# Patient Record
Sex: FEMALE | Race: OTHER | Employment: UNEMPLOYED | URBAN - METROPOLITAN AREA
[De-identification: names, ages, dates, MRNs, and addresses within clinical notes are randomized per-mention and may not be internally consistent; named-entity substitution may affect disease eponyms.]

---

## 2021-04-19 ENCOUNTER — HOSPITAL ENCOUNTER (EMERGENCY)
Age: 26
Discharge: HOME | End: 2021-04-19
Attending: EMERGENCY MEDICINE

## 2021-04-19 VITALS
DIASTOLIC BLOOD PRESSURE: 63 MMHG | RESPIRATION RATE: 16 BRPM | OXYGEN SATURATION: 100 % | TEMPERATURE: 100.2 F | SYSTOLIC BLOOD PRESSURE: 120 MMHG | WEIGHT: 157.9 LBS | HEART RATE: 64 BPM

## 2021-04-19 DIAGNOSIS — N30.00 ACUTE CYSTITIS WITHOUT HEMATURIA: Primary | ICD-10-CM

## 2021-04-19 DIAGNOSIS — D50.9 IRON DEFICIENCY ANEMIA, UNSPECIFIED IRON DEFICIENCY ANEMIA TYPE: ICD-10-CM

## 2021-04-19 LAB
ANION GAP SERPL CALCULATED.3IONS-SCNC: 9 MMOL/L (ref 3–16)
ANISOCYTOSIS: ABNORMAL
BACTERIA: ABNORMAL /HPF
BASOPHILS ABSOLUTE: 0 K/UL (ref 0–0.2)
BASOPHILS RELATIVE PERCENT: 0 %
BILIRUBIN URINE: NEGATIVE
BLOOD, URINE: ABNORMAL
BUN BLDV-MCNC: 8 MG/DL (ref 7–20)
CALCIUM SERPL-MCNC: 8.8 MG/DL (ref 8.3–10.6)
CHLORIDE BLD-SCNC: 98 MMOL/L (ref 99–110)
CLARITY: CLEAR
CO2: 25 MMOL/L (ref 21–32)
COLOR: YELLOW
CREAT SERPL-MCNC: 0.7 MG/DL (ref 0.6–1.1)
EOSINOPHILS ABSOLUTE: 0 K/UL (ref 0–0.6)
EOSINOPHILS RELATIVE PERCENT: 0 %
EPITHELIAL CELLS, UA: ABNORMAL /HPF (ref 0–5)
FERRITIN: 11.2 NG/ML (ref 15–150)
GFR AFRICAN AMERICAN: >60
GFR NON-AFRICAN AMERICAN: >60
GLUCOSE BLD-MCNC: 94 MG/DL (ref 70–99)
GLUCOSE URINE: NEGATIVE MG/DL
HCG(URINE) PREGNANCY TEST: NEGATIVE
HCT VFR BLD CALC: 25.5 % (ref 36–48)
HCT VFR BLD CALC: 25.6 % (ref 36–48)
HEMOGLOBIN: 7.1 G/DL (ref 12–16)
HYPOCHROMIA: ABNORMAL
IMMATURE RETIC FRACT: 0.42 (ref 0.21–0.37)
KETONES, URINE: NEGATIVE MG/DL
LEUKOCYTE ESTERASE, URINE: ABNORMAL
LYMPHOCYTES ABSOLUTE: 0.6 K/UL (ref 1–5.1)
LYMPHOCYTES RELATIVE PERCENT: 6 %
MCH RBC QN AUTO: 15.4 PG (ref 26–34)
MCHC RBC AUTO-ENTMCNC: 27.7 G/DL (ref 31–36)
MCV RBC AUTO: 55.5 FL (ref 80–100)
MICROSCOPIC EXAMINATION: YES
MONOCYTES ABSOLUTE: 0.2 K/UL (ref 0–1.3)
MONOCYTES RELATIVE PERCENT: 2 %
NEUTROPHILS ABSOLUTE: 9.4 K/UL (ref 1.7–7.7)
NEUTROPHILS RELATIVE PERCENT: 92 %
NITRITE, URINE: POSITIVE
OVALOCYTES: ABNORMAL
PDW BLD-RTO: 21.5 % (ref 12.4–15.4)
PH UA: 6 (ref 5–8)
PLATELET # BLD: 303 K/UL (ref 135–450)
PMV BLD AUTO: 8 FL (ref 5–10.5)
POTASSIUM REFLEX MAGNESIUM: 3.6 MMOL/L (ref 3.5–5.1)
PROTEIN UA: NEGATIVE MG/DL
RBC # BLD: 4.59 M/UL (ref 4–5.2)
RBC UA: ABNORMAL /HPF (ref 0–4)
RETICULOCYTE ABSOLUTE COUNT: 0.07 M/UL (ref 0.02–0.1)
RETICULOCYTE COUNT PCT: 1.54 % (ref 0.5–2.18)
SODIUM BLD-SCNC: 132 MMOL/L (ref 136–145)
SPECIFIC GRAVITY UA: 1.01 (ref 1–1.03)
URINE TYPE: ABNORMAL
UROBILINOGEN, URINE: 0.2 E.U./DL
WBC # BLD: 10.2 K/UL (ref 4–11)
WBC UA: ABNORMAL /HPF (ref 0–5)

## 2021-04-19 PROCEDURE — 6360000002 HC RX W HCPCS: Performed by: STUDENT IN AN ORGANIZED HEALTH CARE EDUCATION/TRAINING PROGRAM

## 2021-04-19 PROCEDURE — 83550 IRON BINDING TEST: CPT

## 2021-04-19 PROCEDURE — 36415 COLL VENOUS BLD VENIPUNCTURE: CPT

## 2021-04-19 PROCEDURE — 85025 COMPLETE CBC W/AUTO DIFF WBC: CPT

## 2021-04-19 PROCEDURE — 82728 ASSAY OF FERRITIN: CPT

## 2021-04-19 PROCEDURE — 81001 URINALYSIS AUTO W/SCOPE: CPT

## 2021-04-19 PROCEDURE — U0003 INFECTIOUS AGENT DETECTION BY NUCLEIC ACID (DNA OR RNA); SEVERE ACUTE RESPIRATORY SYNDROME CORONAVIRUS 2 (SARS-COV-2) (CORONAVIRUS DISEASE [COVID-19]), AMPLIFIED PROBE TECHNIQUE, MAKING USE OF HIGH THROUGHPUT TECHNOLOGIES AS DESCRIBED BY CMS-2020-01-R: HCPCS

## 2021-04-19 PROCEDURE — 6370000000 HC RX 637 (ALT 250 FOR IP): Performed by: STUDENT IN AN ORGANIZED HEALTH CARE EDUCATION/TRAINING PROGRAM

## 2021-04-19 PROCEDURE — 2580000003 HC RX 258: Performed by: STUDENT IN AN ORGANIZED HEALTH CARE EDUCATION/TRAINING PROGRAM

## 2021-04-19 PROCEDURE — 96375 TX/PRO/DX INJ NEW DRUG ADDON: CPT

## 2021-04-19 PROCEDURE — 85045 AUTOMATED RETICULOCYTE COUNT: CPT

## 2021-04-19 PROCEDURE — 99283 EMERGENCY DEPT VISIT LOW MDM: CPT

## 2021-04-19 PROCEDURE — U0005 INFEC AGEN DETEC AMPLI PROBE: HCPCS

## 2021-04-19 PROCEDURE — 96374 THER/PROPH/DIAG INJ IV PUSH: CPT

## 2021-04-19 PROCEDURE — 84703 CHORIONIC GONADOTROPIN ASSAY: CPT

## 2021-04-19 PROCEDURE — 80048 BASIC METABOLIC PNL TOTAL CA: CPT

## 2021-04-19 PROCEDURE — 83540 ASSAY OF IRON: CPT

## 2021-04-19 RX ORDER — KETOROLAC TROMETHAMINE 30 MG/ML
15 INJECTION, SOLUTION INTRAMUSCULAR; INTRAVENOUS ONCE
Status: COMPLETED | OUTPATIENT
Start: 2021-04-19 | End: 2021-04-19

## 2021-04-19 RX ORDER — SODIUM CHLORIDE, SODIUM LACTATE, POTASSIUM CHLORIDE, CALCIUM CHLORIDE 600; 310; 30; 20 MG/100ML; MG/100ML; MG/100ML; MG/100ML
1000 INJECTION, SOLUTION INTRAVENOUS ONCE
Status: COMPLETED | OUTPATIENT
Start: 2021-04-19 | End: 2021-04-19

## 2021-04-19 RX ORDER — LANOLIN ALCOHOL/MO/W.PET/CERES
325 CREAM (GRAM) TOPICAL 2 TIMES DAILY
Qty: 90 TABLET | Refills: 3 | Status: SHIPPED | OUTPATIENT
Start: 2021-04-19

## 2021-04-19 RX ORDER — CEPHALEXIN 500 MG/1
500 CAPSULE ORAL 4 TIMES DAILY
Qty: 20 CAPSULE | Refills: 0 | Status: SHIPPED | OUTPATIENT
Start: 2021-04-19 | End: 2021-04-24

## 2021-04-19 RX ORDER — PROCHLORPERAZINE EDISYLATE 5 MG/ML
10 INJECTION INTRAMUSCULAR; INTRAVENOUS ONCE
Status: COMPLETED | OUTPATIENT
Start: 2021-04-19 | End: 2021-04-19

## 2021-04-19 RX ORDER — CEPHALEXIN 500 MG/1
500 CAPSULE ORAL ONCE
Status: COMPLETED | OUTPATIENT
Start: 2021-04-19 | End: 2021-04-19

## 2021-04-19 RX ADMIN — KETOROLAC TROMETHAMINE 15 MG: 30 INJECTION, SOLUTION INTRAMUSCULAR at 17:26

## 2021-04-19 RX ADMIN — PROCHLORPERAZINE EDISYLATE 10 MG: 5 INJECTION INTRAMUSCULAR; INTRAVENOUS at 17:26

## 2021-04-19 RX ADMIN — CEPHALEXIN 500 MG: 500 CAPSULE ORAL at 18:08

## 2021-04-19 RX ADMIN — SODIUM CHLORIDE, SODIUM LACTATE, POTASSIUM CHLORIDE, AND CALCIUM CHLORIDE 1000 ML: .6; .31; .03; .02 INJECTION, SOLUTION INTRAVENOUS at 17:26

## 2021-04-19 ASSESSMENT — PAIN DESCRIPTION - LOCATION: LOCATION: HEAD

## 2021-04-19 ASSESSMENT — PAIN SCALES - GENERAL: PAINLEVEL_OUTOF10: 8

## 2021-04-19 ASSESSMENT — PAIN DESCRIPTION - PAIN TYPE: TYPE: ACUTE PAIN

## 2021-04-19 ASSESSMENT — PAIN DESCRIPTION - FREQUENCY: FREQUENCY: CONTINUOUS

## 2021-04-19 ASSESSMENT — PAIN DESCRIPTION - DESCRIPTORS: DESCRIPTORS: HEADACHE

## 2021-04-19 NOTE — ED PROVIDER NOTES
ED Attending Attestation Note     Date of evaluation: 4/19/2021    This patient was seen by the resident. I have seen and examined the patient, agree with the workup, evaluation, management and diagnosis. The care plan has been discussed. My assessment reveals patient here with HA and stomach discomfort. Found to have UTI and to be profoundly anemic (asymptomatic) and will need followup at the [facility] clinic since no PCP. Patient will be treated for UTI.      Hillary Dale MD  04/19/21 2051

## 2021-04-19 NOTE — ED PROVIDER NOTES
[address] [facility] RESIDENT NOTE       Date of evaluation: 4/19/2021    Chief Complaint     Headache (generalized body aches since last night)      History of Present Illness     Veronique Jarquin is a 31 y.o. female who presents for a generalized headache onset last night. This is associated with generalized body aches, generalized abdominal cramping, and dysuria. Denies any fever, neck pain or neck stiffness or photophobia or any rashes. No vaginal bleeding. No chest pain or difficulty breathing. No fatigue or dizziness or lightheadedness or syncope. No flank pain. No recent URI symptoms or cough. Has not had her Covid vaccine yet. Denies any known Covid positive exposures. No focal weakness, numbness or paresthesias. She took Tylenol last night which did not help her symptoms. Denies any trauma to her head, headache was gradual in onset. Review of Systems   As per HPI, otherwise all other systems reviewed and negative. Past Medical, Surgical, Family, and Social History     She has no past medical history on file. She has no past surgical history on file. Her family history is not on file. She reports that she has never smoked. She has never used smokeless tobacco. She reports previous alcohol use. She reports previous drug use. Medications     Previous Medications    No medications on file       Allergies     She has No Known Allergies. Physical Exam     INITIAL VITALS: BP: 133/67, Temp: 100.2 °F (37.9 °C), Pulse: 98, Resp: 17, SpO2: 100 %     General:  Well-developed, NAD  HEENT:  Normocephalic, atraumatic, PERRL, EOMI, slightly dry mucous membranes  Neck:  Supple, trachea midline, full range of motion, no nuchal rigidity, no meningismus  Pulmonary:   CTAB, good air movement  Cardiac:  RRR, 3/6 systolic murmur  Abdomen:  Soft, non-tender, non-distended, no rebounding or guarding.   No CVA tenderness bilaterally  Musculoskeletal:  2+ pulses, no peripheral edema  Skin:  No rashes or lesions  Neuro: aox4, able to move all four extremities, equal strength BUE/BLE, sensory exam grossly intact, cranial nerves II-XII intact  Psych:  Appropriate mood and affect    DiagnosticResults     EKG   N/A    RADIOLOGY:  No orders to display       LABS:   Results for orders placed or performed during the hospital encounter of 04/19/21   Urinalysis, reflex to microscopic   Result Value Ref Range    Color, UA Yellow Straw/Yellow    Clarity, UA Clear Clear    Glucose, Ur Negative Negative mg/dL    Bilirubin Urine Negative Negative    Ketones, Urine Negative Negative mg/dL    Specific Gravity, UA 1.015 1.005 - 1.030    Blood, Urine SMALL (A) Negative    pH, UA 6.0 5.0 - 8.0    Protein, UA Negative Negative mg/dL    Urobilinogen, Urine 0.2 <2.0 E.U./dL    Nitrite, Urine POSITIVE (A) Negative    Leukocyte Esterase, Urine SMALL (A) Negative    Microscopic Examination YES     Urine Type NotGiven    Pregnancy, urine   Result Value Ref Range    HCG(Urine) Pregnancy Test Negative Detects HCG level >20 MIU/mL   CBC Auto Differential   Result Value Ref Range    WBC 10.2 4.0 - 11.0 K/uL    RBC 4.59 4.00 - 5.20 M/uL    Hemoglobin 7.1 (L) 12.0 - 16.0 g/dL    Hematocrit 25.5 (L) 36.0 - 48.0 %    MCV 55.5 (L) 80.0 - 100.0 fL    MCH 15.4 (L) 26.0 - 34.0 pg    MCHC 27.7 (L) 31.0 - 36.0 g/dL    RDW 21.5 (H) 12.4 - 15.4 %    Platelets 623 059 - 950 K/uL    MPV 8.0 5.0 - 10.5 fL    Neutrophils % 92.0 %    Lymphocytes % 6.0 %    Monocytes % 2.0 %    Eosinophils % 0.0 %    Basophils % 0.0 %    Neutrophils Absolute 9.4 (H) 1.7 - 7.7 K/uL    Lymphocytes Absolute 0.6 (L) 1.0 - 5.1 K/uL    Monocytes Absolute 0.2 0.0 - 1.3 K/uL    Eosinophils Absolute 0.0 0.0 - 0.6 K/uL    Basophils Absolute 0.0 0.0 - 0.2 K/uL    Anisocytosis 1+ (A)     Hypochromia 1+ (A)     Ovalocytes Occasional (A)    Basic Metabolic Panel w/ Reflex to MG   Result Value Ref Range    Sodium 132 (L) 136 - 145 mmol/L    Potassium reflex Magnesium 3.6 3.5 - 5.1 mmol/L    Chloride 98 (L) 99 - 110 mmol/L    CO2 25 21 - 32 mmol/L    Anion Gap 9 3 - 16    Glucose 94 70 - 99 mg/dL    BUN 8 7 - 20 mg/dL    CREATININE 0.7 0.6 - 1.1 mg/dL    GFR Non-African American >60 >60    GFR African American >60 >60    Calcium 8.8 8.3 - 10.6 mg/dL   Microscopic Urinalysis   Result Value Ref Range    WBC, UA 6-9 (A) 0 - 5 /HPF    RBC, UA 0-2 0 - 4 /HPF    Epithelial Cells, UA 0-1 0 - 5 /HPF    Bacteria, UA 4+ (A) None Seen /HPF   Ferritin   Result Value Ref Range    Ferritin 11.2 (L) 15.0 - 150.0 ng/mL   Reticulocytes   Result Value Ref Range    Retic Ct Pct 1.54 0.50 - 2.18 %    Retic Ct Abs 0.071 0.024 - 0.100 M/uL    Immature Retic Fract 0.42 (H) 0.21 - 0.37    Hematocrit 25.6 (L) 36.0 - 48.0 %       ED BEDSIDE ULTRASOUND:  N/A    RECENT VITALS:  BP: 120/63, Temp: 100.2 °F (37.9 °C), Pulse: 64,Resp: 16, SpO2: 100 %     Procedures   Procedures    ED Course     Nursing Notes, Past Medical Hx, Past Surgical Hx, Social Hx, Allergies, and Family Hx were reviewed. The patient was given the followingmedications:  Orders Placed This Encounter   Medications    lactated ringers infusion 1,000 mL    prochlorperazine (COMPAZINE) injection 10 mg    ketorolac (TORADOL) injection 15 mg    cephALEXin (KEFLEX) capsule 500 mg     Order Specific Question:   Antimicrobial Indications     Answer:   Urinary Tract Infection    cephALEXin (KEFLEX) 500 MG capsule     Sig: Take 1 capsule by mouth 4 times daily for 5 days     Dispense:  20 capsule     Refill:  0    ferrous sulfate (FE TABS) 325 (65 Fe) MG EC tablet     Sig: Take 1 tablet by mouth 2 times daily     Dispense:  90 tablet     Refill:  3       CONSULTS:  None    MEDICAL DECISION MAKING / ASSESSMENT / PLAN     Ana White is a 31 y.o. female who presents to the ED for generalized headache, body aches, abdominal cramping and dysuria since last night. On exam without any nuchal rigidity, neck stiffness or neck pain. Abdomen is soft, nontender, nondistended. Headache gradual in onset, not associated with any trauma. Will check UA, U-pregnancy, CBC, BMP, Covid, and administer IV fluids, Compazine and Toradol. This patient was also evaluated by the attending physician. All care plans were discussed and agreed upon. UA with evidence of a UTI. She was given first dose of Keflex in the ED. Urine pregnancy test is negative. Has mild hyponatremia however no KIYA. She feels improved after IV fluids, Compazine and Toradol. CBC resulted with significant anemia to 7.1 with MCV of 55. She denies any hemorrhagic symptoms. No symptomatic anemia to include dizziness, lightheadedness or fatigue or difficulty breathing. No previous records available for comparison. Unknown of the patient's murmur is new versus old, she does not recall being told of any previous murmurs. Denies a history of IV drug use. Ordered a reticulocyte count along with iron studies and folate and B12. Decrease ferritin level along with reticulocyte count. Appears chronic in nature. Given the fact that she is hemodynamically stable without symptomatic anemia, she will be discharged with return precautions and a prescription for iron. She was given a prescription for Keflex 500 mg 4 times daily x5 days. She was also given a number to call at the [facility]. outpatient clinic to establish care with a PCP. At this time the patient has been deemed safe for discharge. Verbal discharge instructions including strict return precautions for worsening or new symptoms have been communicated. The patient was advised to follow up with [facility] hospital outpatient clinic. Clinical Impression     1. Acute cystitis without hematuria    2.  Iron deficiency anemia, unspecified iron deficiency anemia type        Disposition     PATIENT REFERRED TO:  [facility] Emergency Department  [facility]  Go to

## 2021-04-19 NOTE — ED NOTES
Patient discharged to home via family. Written discharge instructions reviewed with understanding. Copy of AVS and signed prescription sent home with patient. Patient able to walk from ED without assistance.        Karla Longo RN  04/19/21 1925

## 2021-04-20 LAB
IRON SATURATION: 5 % (ref 15–50)
IRON: 19 UG/DL (ref 37–145)
SARS-COV-2: NOT DETECTED
TOTAL IRON BINDING CAPACITY: 352 UG/DL (ref 260–445)

## 2021-04-22 ENCOUNTER — NURSE TRIAGE (OUTPATIENT)
Dept: OTHER | Facility: CLINIC | Age: 26
End: 2021-04-22

## 2021-04-22 NOTE — PROGRESS NOTES
Shashank Schneider   31 y.o. female   1995    This is patient's first visit with me. Shashank Schneider is here to establish care as their new PCP. Shashank Schneider has a PMH significant for: There is no problem list on file for this patient. Reason(s) for visit:   Chief Complaint   Patient presents with    Established New Doctor    Follow-Up from Hospital     Pt was in the ER due to UTI. Religious on 4/19/21.  Urinary Tract Infection     Sx went away. HPI:    Office visit encounter: This is patient's first UTI. Patient's labs are remarkable for H/H of 7.1/25.5, MCV of 55.5, MCHC of 27.7, RDW of 21.5. BMP was only remarkable for hyponatremia of 132. Iron studies showed ferritin of 11.2 with low iron of 19, iron saturation of 5%, and normal TIBC of 352. Urine studies showed negative UPT while UA showed 4+ bacteria with 6-9 WBCs. No urine culture was ordered. She was also tested for COVID-19 and she is negative. Patient was treated for acute cystitis and discharged on 5-day course of cephalexin as well as iron tablets. He stated that she is significantly much better. No side effects with taking Cephalexin. Patient stated that this is her first UTI. Upon further history, she reported that she has not had a period in 2 to 3 months. She denied her previous periods of being heavy. No known history of anemia. She reported that she has not had a period in 2-3 months. She denied heavy periods. No known hx of anemia. Regarding any symptoms, patient reported chronic fatigue and especially chronic dizziness. She's originally from [facility] and has lived in [address] [place] [address] for about 8 years. She's unemployed. Not . Has no children. Patient also discussed other issues, she wanted to have her boyfriend to come in to help interpret. Patient's English proficiency was overall very good.   I informed her that our office has access to independent [place] interpreters and they will be more the preferred way to go. She ultimately decided to not pursue getting an  and stated that this issue can be discussed at another time. PDMP monitoring:  -Revealed no controlled medications written of any kind.    -Last report:   Last PDMP Keith as Reviewed [facility]):  Review User Review Instant Review Result   [address] CHRISTIAN 4/22/2021  3:46 PM Reviewed PDMP [1]       No Known Allergies    Current Outpatient Medications on File Prior to Visit   Medication Sig Dispense Refill    cephALEXin (KEFLEX) 500 MG capsule Take 1 capsule by mouth 4 times daily for 5 days 20 capsule 0    ferrous sulfate (FE TABS) 325 (65 Fe) MG EC tablet Take 1 tablet by mouth 2 times daily 90 tablet 3     No current facility-administered medications on file prior to visit. No family history on file. Social History     Tobacco Use    Smoking status: Never Smoker    Smokeless tobacco: Never Used   Substance Use Topics    Alcohol use: Not Currently        Lab Results   Component Value Date    WBC 10.2 04/19/2021    HGB 7.1 (L) 04/19/2021    HCT 25.5 (L) 04/19/2021    HCT 25.6 (L) 04/19/2021    MCV 55.5 (L) 04/19/2021     04/19/2021       Chemistry        Component Value Date/Time     (L) 04/19/2021 1712    K 3.6 04/19/2021 1712    CL 98 (L) 04/19/2021 1712    CO2 25 04/19/2021 1712    BUN 8 04/19/2021 1712    CREATININE 0.7 04/19/2021 1712        Component Value Date/Time    CALCIUM 8.8 04/19/2021 1712          No results found for: ALT, AST, GGT, ALKPHOS, BILITOT  No results found for: LABA1C  No results found for: EAG    Review of Systems   Constitutional: Positive for activity change and fatigue. Negative for appetite change, fever and unexpected weight change. HENT: Negative for congestion, rhinorrhea, sinus pressure and trouble swallowing. Respiratory: Negative for cough, chest tightness, shortness of breath and wheezing.     Cardiovascular: Negative for chest pain, palpitations and leg swelling. Gastrointestinal: Negative for abdominal distention, abdominal pain, blood in stool, constipation, diarrhea, nausea and vomiting. Genitourinary: Negative for dysuria, frequency and hematuria. Musculoskeletal: Negative for arthralgias and back pain. Skin: Negative for rash. Neurological: Negative for dizziness, weakness, light-headedness, numbness and headaches. Psychiatric/Behavioral: Negative for sleep disturbance. Wt Readings from Last 3 Encounters:   04/23/21 163 lb 6.4 oz (74.1 kg)   04/19/21 157 lb 14.4 oz (71.6 kg)       BP Readings from Last 3 Encounters:   04/23/21 110/72   04/19/21 120/63       Pulse Readings from Last 3 Encounters:   04/23/21 71   04/19/21 64       /72   Pulse 71   Temp 97.7 °F (36.5 °C)   Ht 5' 4.5\" (1.638 m)   Wt 163 lb 6.4 oz (74.1 kg)   LMP  (LMP Unknown)   SpO2 99%   BMI 27.61 kg/m²      Physical Exam  Vitals signs reviewed. Constitutional:       General: She is awake. She is not in acute distress. Appearance: She is overweight. She is not ill-appearing or diaphoretic. HENT:      Head: Normocephalic and atraumatic. Right Ear: External ear normal.      Left Ear: External ear normal.      Nose: Nose normal.   Eyes:      General: Lids are normal. Gaze aligned appropriately. No scleral icterus. Right eye: No discharge. Left eye: No discharge. Extraocular Movements: Extraocular movements intact. Conjunctiva/sclera: Conjunctivae normal.   Neck:      Musculoskeletal: Normal range of motion. No erythema. Trachea: Phonation normal.   Cardiovascular:      Rate and Rhythm: Normal rate and regular rhythm. Pulmonary:      Effort: Pulmonary effort is normal. No respiratory distress. Breath sounds: No wheezing, rhonchi or rales. Abdominal:      General: Abdomen is flat. There is no distension. Palpations: Abdomen is soft. Musculoskeletal: Normal range of motion. General: No deformity. of the time, progress notes are completed usually long after the patient was seen. Every effort is made to have notes as well as other things that needed to be attended to (e.g. medication refills, MyChart messages, documents that require reviewing, etc.) be addressed and completed in a timely fashion (ideally within 72 hours of the patient encounter). It is also worth noting that healthcare providers often see several patients a day (e.g. > 15-30 patients/day) in either the outpatient and/or inpatient setting(s). In addition, healthcare providers spend a significant amount of time reviewing multiple patients' charts in preparation for their future encounters (pre-charting) as well as time spent reviewing any labs, imaging, specialist's notes (if relevant), and other documents (e.g. recent ER visits or hospital stays or completing forms such as FMLA, short-term/long-term disability), etc.  Time is also allocated to attending to patient's messages on [employer], phone calls from various people, attending to prescription refills/orders, and also attending meetings or conferences throughout the day. Time and effort is also allocated to coordinating with office staff to make sure various things are completed as part of the day-to-day office management responsibilities. For the most part, substantial portion of each given day is usually spent with direct patient care followed by documenting. Given all this, it is worth pointing out that not every detail of the encounter can be remembered and not everything discussed is considered relevant, significant, or noteworthy. It is also worth mentioning that my recollection of the visit may differ from the respective patient's recollection of the visit.   This note is primarily written for myself (the healthcare provider) utilizing one of my own customized templates so I can recall what happened during that visit and may be used for future reference for myself to prepare for follow up appointments. My note is also written in mind for other healthcare professionals (who have their own extensive medical background and experience) for their own understanding (of what happened during the visit) and also more importantly to hopefully help influence and play a role in formulating their plan of care along with their own unique medical expertise. If one has any questions or concerns about my given note, please take into consideration all these aforementioned things before contacting. Facundo Keyes M.D.   [address] [place] [address]    Electronically signed by Wayne Hollins on 4/23/2021 at 5:40 PM.

## 2021-04-22 NOTE — TELEPHONE ENCOUNTER
Received call from Ephraim Hyman at [facility] [place] with Red Flag Complaint. Brief description of triage:   Pt is calling to schedule a follow up appointment. Pt was seen in the ED on 4/19/2021 for a UTI. Pt denies any new or worsening symptoms. No triage. Care advice provided, patient verbalizes understanding; denies any other questions or concerns; instructed to call back for any new or worsening symptoms. Writer provided warm transfer to [place] [facility] for appointment scheduling. Attention Provider: Thank you for allowing me to participate in the care of your patient. The patient was connected to triage in response to information provided to the ECC. Please do not respond through this encounter as the response is not directed to a shared pool. Reason for Disposition  Vincenzo Bains Caller has already spoken with another triager or PCP (or office), and has further questions and triager able to answer questions.     Protocols used: NO CONTACT OR DUPLICATE CONTACT CALL-ADULT-[place]

## 2021-04-23 ENCOUNTER — OFFICE VISIT (OUTPATIENT)
Dept: FAMILY MEDICINE CLINIC | Age: 26
End: 2021-04-23

## 2021-04-23 VITALS
BODY MASS INDEX: 27.22 KG/M2 | HEIGHT: 65 IN | OXYGEN SATURATION: 99 % | SYSTOLIC BLOOD PRESSURE: 110 MMHG | HEART RATE: 71 BPM | TEMPERATURE: 97.7 F | DIASTOLIC BLOOD PRESSURE: 72 MMHG | WEIGHT: 163.4 LBS

## 2021-04-23 DIAGNOSIS — N30.00 ACUTE CYSTITIS WITHOUT HEMATURIA: ICD-10-CM

## 2021-04-23 DIAGNOSIS — Z76.89 ENCOUNTER TO ESTABLISH CARE WITH NEW DOCTOR: Primary | ICD-10-CM

## 2021-04-23 DIAGNOSIS — D50.9 IRON DEFICIENCY ANEMIA, UNSPECIFIED IRON DEFICIENCY ANEMIA TYPE: ICD-10-CM

## 2021-04-23 PROCEDURE — 99203 OFFICE O/P NEW LOW 30 MIN: CPT | Performed by: FAMILY MEDICINE

## 2021-04-23 ASSESSMENT — ENCOUNTER SYMPTOMS
COUGH: 0
BACK PAIN: 0
DIARRHEA: 0
WHEEZING: 0
BLOOD IN STOOL: 0
ABDOMINAL PAIN: 0
TROUBLE SWALLOWING: 0
SINUS PRESSURE: 0
CONSTIPATION: 0
SHORTNESS OF BREATH: 0
CHEST TIGHTNESS: 0
NAUSEA: 0
ABDOMINAL DISTENTION: 0
VOMITING: 0
RHINORRHEA: 0

## 2021-04-23 ASSESSMENT — PATIENT HEALTH QUESTIONNAIRE - PHQ9
SUM OF ALL RESPONSES TO PHQ QUESTIONS 1-9: 0
SUM OF ALL RESPONSES TO PHQ9 QUESTIONS 1 & 2: 0

## 2021-04-23 NOTE — PATIENT INSTRUCTIONS
[employer] account. Enter [id number] in the [facility] box to learn more about \"Iron-Rich Diet: Care Instructions. \"     If you do not have an account, please click on the \"Sign Up Now\" link. Current as of: December 17, 2020               Content Version: 12.8  © 0701-5714 [employer], [employer]. Care instructions adapted under license by [facility] ([facility]). If you have questions about a medical condition or this instruction, always ask your healthcare professional. [address] any warranty or liability for your use of this information.

## 2021-05-25 PROCEDURE — 99284 EMERGENCY DEPT VISIT MOD MDM: CPT

## 2021-05-26 ENCOUNTER — APPOINTMENT (OUTPATIENT)
Dept: GENERAL RADIOLOGY | Age: 26
End: 2021-05-26

## 2021-05-26 ENCOUNTER — HOSPITAL ENCOUNTER (EMERGENCY)
Age: 26
Discharge: HOME | End: 2021-05-26
Attending: EMERGENCY MEDICINE

## 2021-05-26 ENCOUNTER — APPOINTMENT (OUTPATIENT)
Dept: CT IMAGING | Age: 26
End: 2021-05-26

## 2021-05-26 VITALS
OXYGEN SATURATION: 98 % | WEIGHT: 160 LBS | HEIGHT: 64 IN | RESPIRATION RATE: 18 BRPM | HEART RATE: 89 BPM | TEMPERATURE: 102.3 F | SYSTOLIC BLOOD PRESSURE: 122 MMHG | BODY MASS INDEX: 27.31 KG/M2 | DIASTOLIC BLOOD PRESSURE: 74 MMHG

## 2021-05-26 DIAGNOSIS — N30.00 ACUTE CYSTITIS WITHOUT HEMATURIA: Primary | ICD-10-CM

## 2021-05-26 LAB
ALBUMIN SERPL-MCNC: 4.3 G/DL (ref 3.4–5)
ALP BLD-CCNC: 147 U/L (ref 40–129)
ALT SERPL-CCNC: 12 U/L (ref 10–40)
ANION GAP SERPL CALCULATED.3IONS-SCNC: 11 MMOL/L (ref 3–16)
ANISOCYTOSIS: ABNORMAL
AST SERPL-CCNC: 22 U/L (ref 15–37)
BACTERIA: ABNORMAL /HPF
BANDED NEUTROPHILS RELATIVE PERCENT: 12 % (ref 0–7)
BASOPHILS ABSOLUTE: 0.1 K/UL (ref 0–0.2)
BASOPHILS RELATIVE PERCENT: 1 %
BILIRUB SERPL-MCNC: 0.3 MG/DL (ref 0–1)
BILIRUBIN DIRECT: <0.2 MG/DL (ref 0–0.3)
BILIRUBIN URINE: NEGATIVE
BILIRUBIN, INDIRECT: ABNORMAL MG/DL (ref 0–1)
BLOOD, URINE: ABNORMAL
BUN BLDV-MCNC: 11 MG/DL (ref 7–20)
CALCIUM SERPL-MCNC: 9.4 MG/DL (ref 8.3–10.6)
CHLORIDE BLD-SCNC: 97 MMOL/L (ref 99–110)
CLARITY: CLEAR
CO2: 25 MMOL/L (ref 21–32)
COLOR: YELLOW
CREAT SERPL-MCNC: 0.6 MG/DL (ref 0.6–1.1)
EOSINOPHILS ABSOLUTE: 0.1 K/UL (ref 0–0.6)
EOSINOPHILS RELATIVE PERCENT: 1 %
EPITHELIAL CELLS, UA: ABNORMAL /HPF (ref 0–5)
GFR AFRICAN AMERICAN: >60
GFR NON-AFRICAN AMERICAN: >60
GLUCOSE BLD-MCNC: 93 MG/DL (ref 70–99)
GLUCOSE URINE: NEGATIVE MG/DL
HCG(URINE) PREGNANCY TEST: NEGATIVE
HCT VFR BLD CALC: 38.8 % (ref 36–48)
HEMOGLOBIN: 12.6 G/DL (ref 12–16)
KETONES, URINE: NEGATIVE MG/DL
LACTIC ACID: 1.2 MMOL/L (ref 0.4–2)
LEUKOCYTE ESTERASE, URINE: ABNORMAL
LIPASE: 27 U/L (ref 13–60)
LYMPHOCYTES ABSOLUTE: 0.3 K/UL (ref 1–5.1)
LYMPHOCYTES RELATIVE PERCENT: 5 %
MACROCYTES: ABNORMAL
MCH RBC QN AUTO: 25 PG (ref 26–34)
MCHC RBC AUTO-ENTMCNC: 32.4 G/DL (ref 31–36)
MCV RBC AUTO: 77.1 FL (ref 80–100)
MICROCYTES: ABNORMAL
MICROSCOPIC EXAMINATION: YES
MONOCYTES ABSOLUTE: 0 K/UL (ref 0–1.3)
MONOCYTES RELATIVE PERCENT: 0 %
MUCUS: ABNORMAL /LPF
NEUTROPHILS ABSOLUTE: 5.3 K/UL (ref 1.7–7.7)
NEUTROPHILS RELATIVE PERCENT: 81 %
NITRITE, URINE: NEGATIVE
OVALOCYTES: ABNORMAL
PDW BLD-RTO: 37.4 % (ref 12.4–15.4)
PH UA: 6 (ref 5–8)
PLATELET # BLD: 129 K/UL (ref 135–450)
PMV BLD AUTO: 8.2 FL (ref 5–10.5)
POIKILOCYTES: ABNORMAL
POLYCHROMASIA: ABNORMAL
POTASSIUM SERPL-SCNC: 3.7 MMOL/L (ref 3.5–5.1)
PROTEIN UA: NEGATIVE MG/DL
RAPID INFLUENZA  B AGN: NEGATIVE
RAPID INFLUENZA A AGN: NEGATIVE
RBC # BLD: 5.04 M/UL (ref 4–5.2)
RBC UA: ABNORMAL /HPF (ref 0–4)
SARS-COV-2: NOT DETECTED
SCHISTOCYTES: ABNORMAL
SODIUM BLD-SCNC: 133 MMOL/L (ref 136–145)
SPECIFIC GRAVITY UA: 1.01 (ref 1–1.03)
TOTAL PROTEIN: 8.2 G/DL (ref 6.4–8.2)
URINE TYPE: ABNORMAL
UROBILINOGEN, URINE: 0.2 E.U./DL
WBC # BLD: 5.7 K/UL (ref 4–11)
WBC UA: ABNORMAL /HPF (ref 0–5)

## 2021-05-26 PROCEDURE — 83605 ASSAY OF LACTIC ACID: CPT

## 2021-05-26 PROCEDURE — U0003 INFECTIOUS AGENT DETECTION BY NUCLEIC ACID (DNA OR RNA); SEVERE ACUTE RESPIRATORY SYNDROME CORONAVIRUS 2 (SARS-COV-2) (CORONAVIRUS DISEASE [COVID-19]), AMPLIFIED PROBE TECHNIQUE, MAKING USE OF HIGH THROUGHPUT TECHNOLOGIES AS DESCRIBED BY CMS-2020-01-R: HCPCS

## 2021-05-26 PROCEDURE — 6360000002 HC RX W HCPCS: Performed by: EMERGENCY MEDICINE

## 2021-05-26 PROCEDURE — 80076 HEPATIC FUNCTION PANEL: CPT

## 2021-05-26 PROCEDURE — 84703 CHORIONIC GONADOTROPIN ASSAY: CPT

## 2021-05-26 PROCEDURE — 71045 X-RAY EXAM CHEST 1 VIEW: CPT

## 2021-05-26 PROCEDURE — 6360000004 HC RX CONTRAST MEDICATION: Performed by: EMERGENCY MEDICINE

## 2021-05-26 PROCEDURE — U0005 INFEC AGEN DETEC AMPLI PROBE: HCPCS

## 2021-05-26 PROCEDURE — 96375 TX/PRO/DX INJ NEW DRUG ADDON: CPT

## 2021-05-26 PROCEDURE — 96365 THER/PROPH/DIAG IV INF INIT: CPT

## 2021-05-26 PROCEDURE — 80048 BASIC METABOLIC PNL TOTAL CA: CPT

## 2021-05-26 PROCEDURE — 87804 INFLUENZA ASSAY W/OPTIC: CPT

## 2021-05-26 PROCEDURE — 36415 COLL VENOUS BLD VENIPUNCTURE: CPT

## 2021-05-26 PROCEDURE — 2580000003 HC RX 258: Performed by: EMERGENCY MEDICINE

## 2021-05-26 PROCEDURE — 74177 CT ABD & PELVIS W/CONTRAST: CPT

## 2021-05-26 PROCEDURE — 81001 URINALYSIS AUTO W/SCOPE: CPT

## 2021-05-26 PROCEDURE — 83690 ASSAY OF LIPASE: CPT

## 2021-05-26 PROCEDURE — 85025 COMPLETE CBC W/AUTO DIFF WBC: CPT

## 2021-05-26 RX ORDER — SODIUM CHLORIDE, SODIUM LACTATE, POTASSIUM CHLORIDE, AND CALCIUM CHLORIDE .6; .31; .03; .02 G/100ML; G/100ML; G/100ML; G/100ML
1000 INJECTION, SOLUTION INTRAVENOUS ONCE
Status: COMPLETED | OUTPATIENT
Start: 2021-05-26 | End: 2021-05-26

## 2021-05-26 RX ORDER — CEFDINIR 300 MG/1
300 CAPSULE ORAL 2 TIMES DAILY
Qty: 14 CAPSULE | Refills: 0 | Status: SHIPPED | OUTPATIENT
Start: 2021-05-26 | End: 2021-06-02

## 2021-05-26 RX ORDER — KETOROLAC TROMETHAMINE 30 MG/ML
15 INJECTION, SOLUTION INTRAMUSCULAR; INTRAVENOUS ONCE
Status: COMPLETED | OUTPATIENT
Start: 2021-05-26 | End: 2021-05-26

## 2021-05-26 RX ADMIN — IOPAMIDOL 80 ML: 755 INJECTION, SOLUTION INTRAVENOUS at 02:08

## 2021-05-26 RX ADMIN — KETOROLAC TROMETHAMINE 15 MG: 30 INJECTION, SOLUTION INTRAMUSCULAR at 01:06

## 2021-05-26 RX ADMIN — SODIUM CHLORIDE, POTASSIUM CHLORIDE, SODIUM LACTATE AND CALCIUM CHLORIDE 1000 ML: 600; 310; 30; 20 INJECTION, SOLUTION INTRAVENOUS at 01:07

## 2021-05-26 RX ADMIN — CEFTRIAXONE 1000 MG: 1 INJECTION, POWDER, FOR SOLUTION INTRAMUSCULAR; INTRAVENOUS at 03:25

## 2021-05-26 ASSESSMENT — ENCOUNTER SYMPTOMS
EYES NEGATIVE: 1
RESPIRATORY NEGATIVE: 1
GASTROINTESTINAL NEGATIVE: 1
BACK PAIN: 1

## 2021-05-26 ASSESSMENT — PAIN SCALES - GENERAL: PAINLEVEL_OUTOF10: 5

## 2021-05-26 NOTE — ED PROVIDER NOTES
1 [facility]  EMERGENCY DEPARTMENT ENCOUNTER          ATTENDING PHYSICIAN NOTE       Date of evaluation: 5/25/2021    Chief Complaint     Chills and Fever      History of Present Illness     Shashank Schneider is a 31 y.o. female who presents to the emergency department complaining of fever, body aches, and back pain. Patient is non-English speaking and history is obtained by her brother who serves as  at her request.  Patient states she was feeling in her normal state of health during the day today. She states this evening she started developing back pain and developing chills. She was diagnosed with urinary tract infection approximately 1 month ago and states that this feels similar. She does note headache. She denies any nausea, vomiting, or diarrhea. She denies any cough or shortness of breath. She does note increased urinary frequency. She has not tried taking anything for her symptoms. Review of Systems     Review of Systems   Constitutional: Positive for fever. Eyes: Negative. Respiratory: Negative. Cardiovascular: Negative. Gastrointestinal: Negative. Genitourinary: Positive for dysuria. Negative for frequency, vaginal bleeding and vaginal discharge. Musculoskeletal: Positive for back pain and myalgias. Neurological: Positive for headaches. All other systems reviewed and are negative. Past Medical, Surgical, Family, and Social History     She has no past medical history on file. She has no past surgical history on file. Her family history is not on file. She reports that she has never smoked. She has never used smokeless tobacco. She reports previous alcohol use. She reports previous drug use.     Medications     Discharge Medication List as of 5/26/2021  4:03 AM      CONTINUE these medications which have NOT CHANGED    Details   Ascorbic Acid (VITAMIN C) 500 MG CHEW Take 500 mg by mouth daily, Disp-90 tablet, R-3Normal      ferrous sulfate (FE TABS) 325 (65 Fe) MG EC tablet Take 1 tablet by mouth 2 times daily, Disp-90 tablet, R-3Print             Allergies     She has No Known Allergies. Physical Exam     INITIAL VITALS: BP: 136/81, Temp: 102.5 °F (39.2 °C), Pulse: 126, Resp: 22, SpO2: 99 %   Physical Exam  Vitals and nursing note reviewed. Constitutional:       General: She is not in acute distress. HENT:      Head: Normocephalic and atraumatic. Mouth/Throat:      Mouth: Mucous membranes are moist.      Pharynx: No oropharyngeal exudate. Eyes:      General: No scleral icterus. Extraocular Movements: Extraocular movements intact. Conjunctiva/sclera: Conjunctivae normal.      Pupils: Pupils are equal, round, and reactive to light. Cardiovascular:      Rate and Rhythm: Regular rhythm. Tachycardia present. Heart sounds: Normal heart sounds. Pulmonary:      Effort: Pulmonary effort is normal.      Breath sounds: Normal breath sounds. No wheezing, rhonchi or rales. Abdominal:      Tenderness: There is generalized abdominal tenderness. There is right CVA tenderness and left CVA tenderness. There is no guarding or rebound. Musculoskeletal:         General: No swelling. Normal range of motion. Cervical back: Normal range of motion and neck supple. Skin:     General: Skin is warm and dry. Neurological:      General: No focal deficit present. Mental Status: She is alert and oriented to person, place, and time. Cranial Nerves: No cranial nerve deficit. Motor: No weakness. Coordination: Coordination normal.         Diagnostic Results     RADIOLOGY:  CT ABDOMEN PELVIS W IV CONTRAST Additional Contrast? None   Final Result   1. Prominent urothelial enhancement. Correlate clinically regarding    inflammatory/infectious process. 2.  Fluid in small bowel loops, nonspecific, can be seen with enteritis    in the appropriate clinical setting. XR CHEST PORTABLE   Final Result     Negative chest x-ray. LABS:   Results for orders placed or performed during the hospital encounter of 05/26/21   Rapid Flu Swab    Specimen: Nasopharyngeal   Result Value Ref Range    Rapid Influenza A Ag Negative Negative    Rapid Influenza B Ag Negative Negative   CBC auto differential   Result Value Ref Range    WBC 5.7 4.0 - 11.0 K/uL    RBC 5.04 4.00 - 5.20 M/uL    Hemoglobin 12.6 12.0 - 16.0 g/dL    Hematocrit 38.8 36.0 - 48.0 %    MCV 77.1 (L) 80.0 - 100.0 fL    MCH 25.0 (L) 26.0 - 34.0 pg    MCHC 32.4 31.0 - 36.0 g/dL    RDW 37.4 (H) 12.4 - 15.4 %    Platelets 934 (L) 762 - 450 K/uL    MPV 8.2 5.0 - 10.5 fL    Neutrophils % 81.0 %    Lymphocytes % 5.0 %    Monocytes % 0.0 %    Eosinophils % 1.0 %    Basophils % 1.0 %    Neutrophils Absolute 5.3 1.7 - 7.7 K/uL    Lymphocytes Absolute 0.3 (L) 1.0 - 5.1 K/uL    Monocytes Absolute 0.0 0.0 - 1.3 K/uL    Eosinophils Absolute 0.1 0.0 - 0.6 K/uL    Basophils Absolute 0.1 0.0 - 0.2 K/uL    Bands Relative 12 (H) 0 - 7 %    Anisocytosis 3+ (A)     Macrocytes 2+ (A)     Microcytes 3+ (A)     Polychromasia Occasional (A)     Poikilocytes 2+ (A)     Schistocytes 1+ (A)     Ovalocytes 1+ (A)    Basic Metabolic Panel (EP - 1)   Result Value Ref Range    Sodium 133 (L) 136 - 145 mmol/L    Potassium 3.7 3.5 - 5.1 mmol/L    Chloride 97 (L) 99 - 110 mmol/L    CO2 25 21 - 32 mmol/L    Anion Gap 11 3 - 16    Glucose 93 70 - 99 mg/dL    BUN 11 7 - 20 mg/dL    CREATININE 0.6 0.6 - 1.1 mg/dL    GFR Non-African American >60 >60    GFR African American >60 >60    Calcium 9.4 8.3 - 10.6 mg/dL   Hepatic function panel (LFTs)   Result Value Ref Range    Total Protein 8.2 6.4 - 8.2 g/dL    Albumin 4.3 3.4 - 5.0 g/dL    Alkaline Phosphatase 147 (H) 40 - 129 U/L    ALT 12 10 - 40 U/L    AST 22 15 - 37 U/L    Total Bilirubin 0.3 0.0 - 1.0 mg/dL    Bilirubin, Direct <0.2 0.0 - 0.3 mg/dL    Bilirubin, Indirect see below 0.0 - 1.0 mg/dL   Lipase   Result Value Ref Range    Lipase 27.0 13.0 - 60.0 U/L   Lactic her brother serve as . Patient does complain of headache but has no nuchal rigidity on exam.  She has clear breath sounds normal heart sounds. She does have a soft abdomen with no rebound or guarding present. Laboratory studies are significant for a left shift but normal white blood cell count. Hemoglobin has improved from her prior visit. Renal panel is unremarkable. LFTs and lipase are unremarkable. Chest x-ray shows no acute airspace disease. Urinalysis is equivocal for urinary tract infection, with only small leukocyte esterase, 6-9 white blood cells, and 1+ bacteria. A CT scan of the abdomen and pelvis was obtained that does show prominence of the urothelium that is concerning for infection. I feel most likely patient symptoms are secondary to urinary tract infection. She was given a dose of antibiotics in the emergency department. She will be started on oral antibiotics and will be instructed to follow-up with her primary care provider. Clinical Impression     1.  Acute cystitis without hematuria        Disposition     PATIENT REFERRED TO:  Adele Edward  [address]  [address] [facility] [address]            DISCHARGE MEDICATIONS:  Discharge Medication List as of 5/26/2021  4:03 AM      START taking these medications    Details   cefdinir (OMNICEF) 300 MG capsule Take 1 capsule by mouth 2 times daily for 7 days, Disp-14 capsule, R-0Print             DISPOSITION          Shona Briggs MD  05/26/21 3101

## 2021-05-26 NOTE — ED NOTES
[facility] paperwork and results explained to patient. Questions addressed and explained to patient's satisfaction.  Patient denies further needs and verbalized understanding of need for FU     Alvin Bower RN  05/26/21 8240

## 2021-06-07 ENCOUNTER — OFFICE VISIT (OUTPATIENT)
Dept: FAMILY MEDICINE CLINIC | Age: 26
End: 2021-06-07

## 2021-06-07 VITALS
HEART RATE: 67 BPM | DIASTOLIC BLOOD PRESSURE: 78 MMHG | OXYGEN SATURATION: 98 % | TEMPERATURE: 97.7 F | BODY MASS INDEX: 29.04 KG/M2 | WEIGHT: 169.2 LBS | SYSTOLIC BLOOD PRESSURE: 122 MMHG

## 2021-06-07 DIAGNOSIS — D50.9 IRON DEFICIENCY ANEMIA, UNSPECIFIED IRON DEFICIENCY ANEMIA TYPE: ICD-10-CM

## 2021-06-07 DIAGNOSIS — N91.2 AMENORRHEA: ICD-10-CM

## 2021-06-07 DIAGNOSIS — N30.90: Primary | ICD-10-CM

## 2021-06-07 DIAGNOSIS — Z91.89 AT RISK FOR SEXUALLY TRANSMITTED DISEASE DUE TO UNPROTECTED SEX: ICD-10-CM

## 2021-06-07 LAB
BILIRUBIN URINE: NEGATIVE
BLOOD, URINE: ABNORMAL
CLARITY: CLEAR
COLOR: YELLOW
GLUCOSE URINE: NEGATIVE MG/DL
HAV IGM SER IA-ACNC: NORMAL
HEPATITIS B CORE IGM ANTIBODY: NORMAL
HEPATITIS B SURFACE ANTIGEN INTERPRETATION: NORMAL
HEPATITIS C ANTIBODY INTERPRETATION: NORMAL
KETONES, URINE: NEGATIVE MG/DL
LEUKOCYTE ESTERASE, URINE: NEGATIVE
MICROSCOPIC EXAMINATION: YES
NITRITE, URINE: NEGATIVE
PH UA: 6 (ref 5–8)
PROTEIN UA: NEGATIVE MG/DL
SPECIFIC GRAVITY UA: 1.01 (ref 1–1.03)
URINE TYPE: ABNORMAL
UROBILINOGEN, URINE: 0.2 E.U./DL

## 2021-06-07 PROCEDURE — 99213 OFFICE O/P EST LOW 20 MIN: CPT | Performed by: FAMILY MEDICINE

## 2021-06-07 RX ORDER — PYRIDOXINE HCL (VITAMIN B6) 100 MG
1000 TABLET ORAL DAILY
Qty: 60 CAPSULE | Refills: 5 | COMMUNITY
Start: 2021-06-07

## 2021-06-07 ASSESSMENT — ENCOUNTER SYMPTOMS
RHINORRHEA: 0
BLOOD IN STOOL: 0
VOMITING: 0
SHORTNESS OF BREATH: 0
DIARRHEA: 0
NAUSEA: 0
ABDOMINAL DISTENTION: 0
WHEEZING: 0
CONSTIPATION: 0
CHEST TIGHTNESS: 0
SINUS PRESSURE: 0
ABDOMINAL PAIN: 0
COUGH: 0
BACK PAIN: 1
TROUBLE SWALLOWING: 0

## 2021-06-07 NOTE — PROGRESS NOTES
Martina Lees   31 y.o. female   1995    HPI:    Patient was last seen by me on 4/23/2021. During our last office visit:   -This was her first visit with me to establish care as her new PCP. Reason(s) for visit:   Chief Complaint   Patient presents with   [address]       Since her last office visit, patient went back to [facility] ER on 5/26/2021 for another episode of acute cystitis. She was seen there last for the same issue on 4/19/2021. She stated that she's back to normal.      Patient also reported that she has not had her period in 2 months. She is not on any form of contraception. She reported of history of heavy period lasting 4-7 days. She had negative pregnancy test when went on 5/26/2021. She was also tested for flu and COVID-19 because of complaint of fever, chills, and myalgia and both results were negative. Of note, patient recently got a job at [employer] (based in [place] [facility]), which involved [address] equipment about a week ago. She has been having intermittent back pain since working there. No Known Allergies    Current Outpatient Medications on File Prior to Visit   Medication Sig Dispense Refill    Ascorbic Acid (VITAMIN C) 500 MG CHEW Take 500 mg by mouth daily 90 tablet 3    ferrous sulfate (FE TABS) 325 (65 Fe) MG EC tablet Take 1 tablet by mouth 2 times daily 90 tablet 3     No current facility-administered medications on file prior to visit. No family history on file.       Social History     Tobacco Use    Smoking status: Never Smoker    Smokeless tobacco: Never Used   Substance Use Topics    Alcohol use: Not Currently        Lab Results   Component Value Date    WBC 5.7 05/26/2021    HGB 12.6 05/26/2021    HCT 38.8 05/26/2021    MCV 77.1 (L) 05/26/2021     (L) 05/26/2021       Chemistry        Component Value Date/Time     (L) 05/26/2021 0047    K 3.7 05/26/2021 0047    K 3.6 04/19/2021 1712    CL 97 (L) 05/26/2021 0047    CO2 25 05/26/2021 0047    BUN 11 05/26/2021 0047    CREATININE 0.6 05/26/2021 0047        Component Value Date/Time    CALCIUM 9.4 05/26/2021 0047    ALKPHOS 147 (H) 05/26/2021 0047    AST 22 05/26/2021 0047    ALT 12 05/26/2021 0047    BILITOT 0.3 05/26/2021 0047          Lab Results   Component Value Date    ALT 12 05/26/2021    AST 22 05/26/2021    ALKPHOS 147 (H) 05/26/2021    BILITOT 0.3 05/26/2021     No results found for: LABA1C  No results found for: EAG    Review of Systems   Constitutional: Positive for fatigue. Negative for activity change, appetite change, fever and unexpected weight change. HENT: Negative for congestion, rhinorrhea, sinus pressure and trouble swallowing. Respiratory: Negative for cough, chest tightness, shortness of breath and wheezing. Cardiovascular: Negative for chest pain, palpitations and leg swelling. Gastrointestinal: Negative for abdominal distention, abdominal pain, blood in stool, constipation, diarrhea, nausea and vomiting. Genitourinary: Positive for menstrual problem. Negative for decreased urine volume, difficulty urinating, dysuria, frequency, hematuria, pelvic pain and urgency. Musculoskeletal: Positive for arthralgias and back pain. Skin: Negative for rash. Neurological: Negative for dizziness, weakness, light-headedness, numbness and headaches. Wt Readings from Last 3 Encounters:   06/07/21 169 lb 3.2 oz (76.7 kg)   05/26/21 160 lb (72.6 kg)   04/23/21 163 lb 6.4 oz (74.1 kg)       BP Readings from Last 3 Encounters:   06/07/21 122/78   05/26/21 122/74   04/23/21 110/72       Pulse Readings from Last 3 Encounters:   06/07/21 67   05/26/21 89   04/23/21 71       /78   Pulse 67   Temp 97.7 °F (36.5 °C)   Wt 169 lb 3.2 oz (76.7 kg)   LMP  (LMP Unknown)   SpO2 98%   BMI 29.04 kg/m²      Physical Exam  Vitals reviewed. Constitutional:       General: She is awake. She is not in acute distress.      Appearance: She is overweight. She is not ill-appearing or diaphoretic. HENT:      Head: Normocephalic and atraumatic. Right Ear: External ear normal.      Left Ear: External ear normal.      Nose: Nose normal.   Eyes:      General: Lids are normal. Gaze aligned appropriately. No scleral icterus. Right eye: No discharge. Left eye: No discharge. Extraocular Movements: Extraocular movements intact. Conjunctiva/sclera: Conjunctivae normal.   Neck:      Trachea: Phonation normal.   Cardiovascular:      Rate and Rhythm: Normal rate and regular rhythm. Pulmonary:      Effort: Pulmonary effort is normal. No respiratory distress. Breath sounds: No wheezing, rhonchi or rales. Abdominal:      General: Abdomen is flat. There is no distension. Palpations: Abdomen is soft. Musculoskeletal:         General: No deformity. Normal range of motion. Cervical back: Normal range of motion. No erythema. Right lower leg: No edema. Left lower leg: No edema. Skin:     Coloration: Skin is not cyanotic, jaundiced or pale. Findings: No abrasion, abscess, bruising, ecchymosis, erythema, signs of injury, laceration, lesion, petechiae, rash or wound. Neurological:      General: No focal deficit present. Mental Status: She is alert. Mental status is at baseline. GCS: GCS eye subscore is 4. GCS verbal subscore is 5. GCS motor subscore is 6. Coordination: Coordination normal.      Gait: Gait is intact. Psychiatric:         Attention and Perception: Attention and perception normal.         Mood and Affect: Mood and affect normal.         Speech: Speech normal.         Behavior: Behavior normal. Behavior is cooperative. Thought Content: Thought content normal.       Assessment/Plan:   Jacqueline Mike was seen today for discuss labs. Diagnoses and all orders for this visit:    Recurrent infective cystitis  -     Hemoglobin A1C; Future  -     HIV Screen;  Future  -     Hepatitis Panel, Acute; Future  -     C.trachomatis N.gonorrhoeae DNA, Urine; Future  -     Urinalysis; Future  -     Cranberry 500 MG CAPS; Take 2 capsules by mouth daily  -     Urinalysis  -     C.trachomatis N.gonorrhoeae DNA, Urine  -     Hepatitis Panel, Acute  -     HIV Screen  -     Hemoglobin A1C    Iron deficiency anemia, unspecified iron deficiency anemia type    At risk for sexually transmitted disease due to unprotected sex  -     HIV Screen; Future  -     Hepatitis Panel, Acute; Future  -     Hepatitis Panel, Acute  -     HIV Screen    Amenorrhea      Other labs: will also obtain CbC, TSH, free T4, vit B12 & folate, blood smear, which was originally ordered on 4/23/2021. I reviewed the plan of care with the patient. Patient acknowledged understanding and agreed with plan of care overall. There are no discontinued medications. General information on medications:  -When it comes to medications, whether with starting or adding a new medication or increasing the dose of a current medication, the benefits and risks have to always be considered and weighed over, especially if one is taking other medications as well. -There are no medications that have no side effects and that there is always a risk involved with taking a medication.    -If a side effect were to occur with starting a new medication or with increasing the dose of a current medication that either the medication can be totally discontinued altogether or simply decrease the dose of it and if this would be the case a follow-up appointment would be deemed necessary.    -The drug allergy list will then be updated with the corresponding side effect(s) if it's deemed to be a true 'drug allergy'.     -The most common adverse effects of medication(s) were addressed at today's visit.    -Lastly, the coverage status of a medication may vary from insurance to insurance and the only way to verify if the medication is covered is to send an actual recent ER visits or hospital stays or completing forms such as FMLA, short-term/long-term disability), etc.  Time is also allocated to attending to patient's messages on TRAFI, phone calls from various people, attending to prescription refills/orders, and also attending meetings or conferences throughout the day. Time and effort is also allocated to coordinating with office staff to make sure various things are completed as part of the day-to-day office management responsibilities. For the most part, substantial portion of each given day is usually spent with direct patient care followed by documenting. Given all this, it is worth pointing out that not every detail of the encounter can be remembered and not everything discussed is considered relevant, significant, or noteworthy. It is also worth mentioning that my recollection of the visit may differ from the respective patient's recollection of the visit. This note is primarily written for myself (the healthcare provider) utilizing one of my own customized templates so I can recall what happened during that visit and may be used for future reference for myself to prepare for follow up appointments. My note is also written in mind for other healthcare professionals (who have their own extensive medical background and experience) for their own understanding (of what happened during the visit) and also more importantly to hopefully help influence and play a role in formulating their plan of care along with their own unique medical expertise. If one has any questions or concerns about my given note, please take into consideration all these aforementioned things before contacting. Facundo Spain M.D.   [address]     Electronically signed by Fern Zhang on 6/7/2021 at 8:25 PM.

## 2021-06-08 DIAGNOSIS — N30.90: Primary | ICD-10-CM

## 2021-06-08 LAB
BACTERIA: ABNORMAL /HPF
C. TRACHOMATIS DNA ,URINE: NEGATIVE
EPITHELIAL CELLS, UA: 3 /HPF (ref 0–5)
ESTIMATED AVERAGE GLUCOSE: 73.8 MG/DL
HBA1C MFR BLD: 4.2 %
HIV AG/AB: NORMAL
HIV ANTIGEN: NORMAL
HIV-1 ANTIBODY: NORMAL
HIV-2 AB: NORMAL
HYALINE CASTS: 0 /LPF (ref 0–8)
N. GONORRHOEAE DNA, URINE: NEGATIVE
RBC UA: 1 /HPF (ref 0–4)
WBC UA: 1 /HPF (ref 0–5)

## 2021-06-08 RX ORDER — CIPROFLOXACIN 250 MG/1
250 TABLET, FILM COATED ORAL 2 TIMES DAILY
Qty: 6 TABLET | Refills: 0 | Status: SHIPPED | OUTPATIENT
Start: 2021-06-08 | End: 2021-06-11

## 2021-06-17 DIAGNOSIS — N39.0 RECURRENT UTI: Primary | ICD-10-CM

## 2021-06-17 RX ORDER — CIPROFLOXACIN 500 MG/1
500 TABLET, FILM COATED ORAL 2 TIMES DAILY
Qty: 10 TABLET | Refills: 0 | Status: SHIPPED | OUTPATIENT
Start: 2021-06-17 | End: 2021-06-22

## 2021-06-18 ENCOUNTER — TELEPHONE (OUTPATIENT)
Dept: FAMILY MEDICINE CLINIC | Age: 26
End: 2021-06-18

## 2021-06-18 NOTE — TELEPHONE ENCOUNTER
Call regarding medication for UTI. Advised Dr. Hoang Pulliam sent in script late last night to pharmacy. Patient can go to pharmacy and  script.

## 2021-07-19 NOTE — PROGRESS NOTES
Eli Perez   31 y.o. female   1995    HPI:    Patient was last seen by me on 6/7/2021. Reason(s) for visit:   Chief Complaint   Patient presents with    Follow-up     Iron deficiency       Patient was treated for recurrent cystitis issues after last office visit. He was prescribed for a total of 8-day course of ciprofloxacin. She stated that she also has been compliant with drinking more water and taking cranberry supplement and systolic. Denied issues with fever, dysuria, hematuria, urethral discharge, vaginal bleeding, pelvic/bladder pain, etc.    Also of note, patient has a past medical history significant for iron deficiency anemia. She stated that she has been compliant with taking her ferrous sulfate twice a day as well as trying to consume more food that rich in iron. As result of taking these measures, patient stated that she overall has had significant increase in her stamina and overall feels better. She clearly does not look as pale as she did before. Her iron studies were performed in April 2021, which showed an iron of 19, TIBC 352, iron saturation 5%, and ferritin of 11.2. She denied any issues with taking iron including no problems with constipation or dyspepsia. Patient also report for the first time of issues with abdominal discomfort that happened during her last office visit with me. She did not seek care anywhere, but she reported that her mother will palpate her stomach and felt like there was something there. Patient stated that this abdominal discomfort suddenly resolved. She denied issues with fever, nausea, vomiting, heartburn, regurgitation, etc.  She has not seen a gynecologist at all in recent memory. She report of irregular periods with her last one being sometime last month.       No Known Allergies    Current Outpatient Medications on File Prior to Visit   Medication Sig Dispense Refill    Cranberry 500 MG CAPS Take 2 capsules by mouth daily 60 capsule 5    Ascorbic Acid (VITAMIN C) 500 MG CHEW Take 500 mg by mouth daily 90 tablet 3    ferrous sulfate (FE TABS) 325 (65 Fe) MG EC tablet Take 1 tablet by mouth 2 times daily 90 tablet 3     No current facility-administered medications on file prior to visit. No family history on file. Social History     Tobacco Use    Smoking status: Never Smoker    Smokeless tobacco: Never Used   Substance Use Topics    Alcohol use: Not Currently        Lab Results   Component Value Date    WBC 5.7 05/26/2021    HGB 12.6 05/26/2021    HCT 38.8 05/26/2021    MCV 77.1 (L) 05/26/2021     (L) 05/26/2021       Chemistry        Component Value Date/Time     (L) 05/26/2021 0047    K 3.7 05/26/2021 0047    K 3.6 04/19/2021 1712    CL 97 (L) 05/26/2021 0047    CO2 25 05/26/2021 0047    BUN 11 05/26/2021 0047    CREATININE 0.6 05/26/2021 0047        Component Value Date/Time    CALCIUM 9.4 05/26/2021 0047    ALKPHOS 147 (H) 05/26/2021 0047    AST 22 05/26/2021 0047    ALT 12 05/26/2021 0047    BILITOT 0.3 05/26/2021 0047          Lab Results   Component Value Date    ALT 12 05/26/2021    AST 22 05/26/2021    ALKPHOS 147 (H) 05/26/2021    BILITOT 0.3 05/26/2021     Lab Results   Component Value Date    LABA1C 4.2 06/07/2021     Lab Results   Component Value Date    EAG 73.8 06/07/2021       Review of Systems   Constitutional: Negative for activity change, appetite change, fatigue, fever and unexpected weight change. HENT: Negative for congestion, rhinorrhea, sinus pressure and trouble swallowing. Respiratory: Negative for cough, chest tightness, shortness of breath and wheezing. Cardiovascular: Negative for chest pain, palpitations and leg swelling. Gastrointestinal: Negative for abdominal distention, abdominal pain, blood in stool, constipation, diarrhea, nausea and vomiting. Genitourinary: Negative for dysuria, frequency and hematuria.    Musculoskeletal: Negative for arthralgias and back pain.   Skin: Negative for rash. Neurological: Negative for dizziness, weakness, light-headedness, numbness and headaches. Psychiatric/Behavioral: Negative for sleep disturbance. Wt Readings from Last 3 Encounters:   07/20/21 180 lb (81.6 kg)   06/07/21 169 lb 3.2 oz (76.7 kg)   05/26/21 160 lb (72.6 kg)       BP Readings from Last 3 Encounters:   07/20/21 138/82   06/07/21 122/78   05/26/21 122/74       Pulse Readings from Last 3 Encounters:   07/20/21 74   06/07/21 67   05/26/21 89       /82   Pulse 74   Temp 97.8 °F (36.6 °C)   Wt 180 lb (81.6 kg)   LMP 07/13/2021   SpO2 98%   BMI 30.90 kg/m²      Physical Exam  Vitals reviewed. Constitutional:       General: She is awake. She is not in acute distress. Appearance: She is not ill-appearing or diaphoretic. HENT:      Head: Normocephalic and atraumatic. No abrasion or masses. Hair is normal.      Right Ear: External ear normal.      Left Ear: External ear normal.      Nose: Nose normal.   Eyes:      General: Lids are normal. Gaze aligned appropriately. No scleral icterus. Right eye: No discharge. Left eye: No discharge. Extraocular Movements: Extraocular movements intact. Conjunctiva/sclera: Conjunctivae normal.   Neck:      Trachea: Phonation normal.   Cardiovascular:      Rate and Rhythm: Normal rate and regular rhythm. Pulmonary:      Effort: Pulmonary effort is normal. No respiratory distress. Breath sounds: No wheezing, rhonchi or rales. Abdominal:      General: Abdomen is flat. There is no distension. Palpations: Abdomen is soft. Musculoskeletal:         General: No deformity. Normal range of motion. Cervical back: Normal range of motion. No erythema. Right lower leg: No edema. Left lower leg: No edema. Skin:     Coloration: Skin is not cyanotic, jaundiced or pale.       Findings: No abrasion, abscess, bruising, ecchymosis, erythema, signs of injury, laceration, lesion, petechiae, rash or wound. Neurological:      General: No focal deficit present. Mental Status: She is alert. Mental status is at baseline. GCS: GCS eye subscore is 4. GCS verbal subscore is 5. GCS motor subscore is 6. Cranial Nerves: No cranial nerve deficit, dysarthria or facial asymmetry. Motor: No weakness, tremor, atrophy or seizure activity. Coordination: Coordination normal.      Gait: Gait is intact. Psychiatric:         Attention and Perception: Attention and perception normal.         Mood and Affect: Mood and affect normal.         Speech: Speech normal.         Behavior: Behavior normal. Behavior is cooperative. Thought Content: Thought content normal.       Assessment/Plan:   Foreign Cartagena was seen today for follow-up. Diagnoses and all orders for this visit:    Iron deficiency anemia, unspecified iron deficiency anemia type  Comments: Will increase ferrous sulfate from twice daily to 3 times daily, take vitamin C to aid with iron absorption, and consume a diet rich in iron. Recurrent UTI  Comments:  Resolved. Advised to continue taking cranberry supplement as well as increase water intake. Vitamin D deficiency  Comments: Will start on vitamin D supplement given that she stays indoors a lot. Orders:  -     vitamin D (ERGOCALCIFEROL) 1.25 MG (40084 UT) CAPS capsule; Take 1 capsule by mouth once a week    Irregular periods/menstrual cycles  Comments:  Referred to [facility]      I reviewed the plan of care with the patient. Patient acknowledged understanding and agreed with plan of care overall. There are no discontinued medications. General information on medications:  -When it comes to medications, whether with starting or adding a new medication or increasing the dose of a current medication, the benefits and risks have to always be considered and weighed over, especially if one is taking other medications as well.    -There are no medications issues/concerns, especially if those issues/concerns are new does not constitute as a 'physical' visit. Follow-up: Return in about 4 months (around 11/20/2021) for annual physical..     Patient was informed that if his or her symptoms worsen to follow up with me sooner or go to the nearest ER if the symptoms are very significant and warrant higher level of care. Regarding my note:  -This note was composed (by me only and not with assistance via a scribe) to the best of my knowledge and recollection of the encounter with the patient using one of my own customized note templates utilizing a combination of typing and dictating with the [address] [place] [address] speech recognition software. As a result, the note may possibly have various errors (e.g. spelling, grammar, and non-sensible words/phrases/statements) despite reviewing the note prior to signing it for completion.    -It is worth noting that most of the time, progress notes are completed usually long after the patient was seen. Every effort is made to have notes as well as other things that needed to be attended to (e.g. medication refills, [employer] messages, documents that require reviewing, etc.) be addressed and completed in a timely fashion (ideally within 72 hours of the patient encounter). -It is also worth noting that healthcare providers often see several patients a day (e.g. > 15-30 patients/day) in either the outpatient and/or inpatient setting(s).   In addition, healthcare providers spend a significant amount of time reviewing multiple patients' charts in preparation for their future encounters (pre-charting) as well as time spent reviewing any labs, imaging, specialist's notes (if relevant), and other documents (e.g. recent ER visits or hospital stays or completing forms such as FMLA, short-term/long-term disability), etc.  Time is also allocated to attending to patient's messages on [employer], phone calls from various people, attending to prescription refills/orders, and also attending meetings or conferences throughout the day. Time and effort is also allocated to coordinating with office staff to make sure various things are completed as part of the day-to-day office management responsibilities. For the most part, substantial portion of each given day is usually spent with direct patient care followed by documenting.  -Given all this, it is worth pointing out that not every detail of the encounter can be remembered and not everything discussed is considered relevant, significant, or noteworthy. It is also worth mentioning that my recollection of the visit may differ from the respective patient's recollection of the visit. This note is primarily written for myself (the healthcare provider) utilizing one of my own customized templates so I can recall what happened during that visit and may be used for future reference for myself to prepare for follow up appointments. My note is also written in mind for other healthcare professionals (who have their own extensive medical background and experience) for their own understanding (of what happened during the visit) and also more importantly to hopefully help influence and play a role in formulating their plan of care along with their own unique medical expertise. If one has any questions or concerns about my given note, please take into consideration all these aforementioned things before contacting. Facundo Egan M.D.   [address]    Electronically signed by Amina Alejandro M.D. on 7/20/2021 at 5:20 PM.

## 2021-07-20 ENCOUNTER — OFFICE VISIT (OUTPATIENT)
Dept: FAMILY MEDICINE CLINIC | Age: 26
End: 2021-07-20

## 2021-07-20 VITALS
TEMPERATURE: 97.8 F | WEIGHT: 180 LBS | BODY MASS INDEX: 30.9 KG/M2 | HEART RATE: 74 BPM | OXYGEN SATURATION: 98 % | DIASTOLIC BLOOD PRESSURE: 82 MMHG | SYSTOLIC BLOOD PRESSURE: 138 MMHG

## 2021-07-20 DIAGNOSIS — D50.9 IRON DEFICIENCY ANEMIA, UNSPECIFIED IRON DEFICIENCY ANEMIA TYPE: Primary | ICD-10-CM

## 2021-07-20 DIAGNOSIS — N39.0 RECURRENT UTI: ICD-10-CM

## 2021-07-20 DIAGNOSIS — E55.9 VITAMIN D DEFICIENCY: ICD-10-CM

## 2021-07-20 DIAGNOSIS — N92.6 IRREGULAR PERIODS/MENSTRUAL CYCLES: ICD-10-CM

## 2021-07-20 PROCEDURE — 99213 OFFICE O/P EST LOW 20 MIN: CPT | Performed by: FAMILY MEDICINE

## 2021-07-20 RX ORDER — ERGOCALCIFEROL 1.25 MG/1
50000 CAPSULE ORAL WEEKLY
Qty: 12 CAPSULE | Refills: 1 | Status: SHIPPED | OUTPATIENT
Start: 2021-07-20

## 2021-07-20 ASSESSMENT — ENCOUNTER SYMPTOMS
CHEST TIGHTNESS: 0
SINUS PRESSURE: 0
ABDOMINAL DISTENTION: 0
VOMITING: 0
TROUBLE SWALLOWING: 0
BLOOD IN STOOL: 0
COUGH: 0
SHORTNESS OF BREATH: 0
BACK PAIN: 0
DIARRHEA: 0
RHINORRHEA: 0
CONSTIPATION: 0
WHEEZING: 0
NAUSEA: 0
ABDOMINAL PAIN: 0

## 2021-07-20 NOTE — PATIENT INSTRUCTIONS
Take iron tablet 3 times a day (instead of 2 times a day)    [address]:    [email or web address]    [phone]  [facility] [address] [place] [address]